# Patient Record
Sex: FEMALE | Race: BLACK OR AFRICAN AMERICAN
[De-identification: names, ages, dates, MRNs, and addresses within clinical notes are randomized per-mention and may not be internally consistent; named-entity substitution may affect disease eponyms.]

---

## 2018-02-28 ENCOUNTER — HOSPITAL ENCOUNTER (EMERGENCY)
Dept: HOSPITAL 17 - NEPK | Age: 23
Discharge: HOME | End: 2018-02-28
Payer: SELF-PAY

## 2018-02-28 VITALS
OXYGEN SATURATION: 99 % | RESPIRATION RATE: 16 BRPM | HEART RATE: 95 BPM | SYSTOLIC BLOOD PRESSURE: 140 MMHG | TEMPERATURE: 97.9 F | DIASTOLIC BLOOD PRESSURE: 86 MMHG

## 2018-02-28 VITALS — WEIGHT: 180.78 LBS | HEIGHT: 61 IN | BODY MASS INDEX: 34.13 KG/M2

## 2018-02-28 DIAGNOSIS — J06.9: Primary | ICD-10-CM

## 2018-02-28 PROCEDURE — 87081 CULTURE SCREEN ONLY: CPT

## 2018-02-28 PROCEDURE — 87880 STREP A ASSAY W/OPTIC: CPT

## 2018-02-28 PROCEDURE — 99283 EMERGENCY DEPT VISIT LOW MDM: CPT

## 2018-02-28 NOTE — PD
HPI


Chief Complaint:  ENT Complaint


Time Seen by Provider:  14:49


Travel History


International Travel<30 days:  No


Contact w/Intl Traveler<30days:  No


Traveled to known affect area:  No





History of Present Illness


HPI


23-year-old female presents to the emergency department for evaluation of nasal 

congestion, sore throat that started yesterday.  She denies any fevers.  No 

chest pain or shortness of breath.  No abdominal pain.  No nausea, vomiting, 

diarrhea.  She reports no chronic medical problems and takes no prescribed 

medications.  She denies any chance of pregnancy.  Current pain is 7/10 without 

radiation, aching.  Mild severity.





PFSH


Past Medical History


Pregnant?:  Not Pregnant





Social History


Alcohol Use:  Yes (socially)


Tobacco Use:  No


Substance Use:  Yes (marijuana)





Allergies-Medications


(Allergen,Severity, Reaction):  


Coded Allergies:  


     No Known Allergies (Unverified , 2/28/18)


Reported Meds & Prescriptions





Reported Meds & Active Scripts


Active


No Active Prescriptions or Reported Medications    








Review of Systems


Except as stated in HPI:  all other systems reviewed are Neg





Physical Exam


Narrative


GENERAL: Well-nourished, well-developed female patient, ambulatory.  Afebrile.


SKIN: Focused skin assessment warm/dry.


HEAD: Normocephalic.  Atraumatic.


ENT: Mucosa pink and moist.  Mild erythema without exudates. No uvular edema. 

No uvular, palatal, or tonsillar deviation. Airway patent. Nasal turbinates 

appear normal without nasal blood, purulent drainage or septal hematoma.  

Bilateral tympanic membranes are clear without erythema or perforation.


EYES: No scleral icterus. No injection or drainage. 


NECK: Supple, trachea midline. No JVD or lymphadenopathy.


CARDIOVASCULAR: Regular rate and rhythm without murmurs, gallops, or rubs. 


RESPIRATORY: Breath sounds equal bilaterally. No accessory muscle use.  Lungs 

sounds are clear to auscultation.


GASTROINTESTINAL: Abdomen soft, non-tender, nondistended. 


MUSCULOSKELETAL: No cyanosis, or edema. 


BACK: Nontender without obvious deformity. No CVA tenderness.





Data


Data


Last Documented VS





Vital Signs








  Date Time  Temp Pulse Resp B/P (MAP) Pulse Ox O2 Delivery O2 Flow Rate FiO2


 


2/28/18 14:46 97.9 95 16 140/86 (104) 99   








Orders





 Orders


Group A Rapid Strep Screen (2/28/18 15:34)


Strep Culture (Group A) (2/28/18 15:48)








[facility]


Medical Decision Making


Medical Screen Exam Complete:  Yes


Emergency Medical Condition:  Yes


Medical Record Reviewed:  Yes


Differential Diagnosis


Strep pharyngitis versus viral pharyngitis versus URI


Narrative Course


23-year-old female presents to the emergency department for evaluation of nasal 

congestion, sore throat.  She appears well on exam.  Strep swab is ordered and 

pending.





Strep is negative.





Patient will be discharged with a prescription for Flonase nasal spray, 

Allegra.  Patient started to rest, drink plenty of fluids.  Patient is 

requesting antibiotic.  However, this appears to be a viral infection and 

antibiotics are not indicated.  Patient is educated on this.





The patient was discharged in stable condition with instructions, including 

return instructions and follow up instructions.





Diagnosis





 Primary Impression:  


 Viral upper respiratory infection


Referrals:  


Primary Care Physician


call for appointment


Patient Instructions:  General Instructions, Upper Respiratory Infection (ED)


Departure Forms:  Tests/Procedures, Work Release   Enter return to work date:  

Mar 3, 2018





***Additional Instructions:  


Over-the-counter Tylenol every 4 hours as needed, over-the-counter ibuprofen 

every 6-8 hours as needed.


Use Flonase nasal spray as directed.


Take Allegra daily.


Follow-up with your primary care physician.


Return to the emergency department for any acute worsening of symptoms.


***Med/Other Pt SpecificInfo:  Prescription(s) given


Scripts


Fexofenadine (Allegra Allergy) 180 Mg Tab


180 MG PO DAILY for Allergy Management, #30 TAB 0 Refills


   Prov: Lizzie Escoto         2/28/18 


Fluticasone Nasal Spray (Flonase Nasal Spray) 50 Mcg/Act Spray


50 MCG EACH NARE BID for Allergies, #1 BOTTLE 0 Refills


   Prov: Lizzie Escoto         2/28/18


Disposition:  01 DISCHARGE HOME


Condition:  Stable











Lizzie Escoto Feb 28, 2018 15:36